# Patient Record
Sex: FEMALE | Race: BLACK OR AFRICAN AMERICAN | NOT HISPANIC OR LATINO | Employment: OTHER | URBAN - METROPOLITAN AREA
[De-identification: names, ages, dates, MRNs, and addresses within clinical notes are randomized per-mention and may not be internally consistent; named-entity substitution may affect disease eponyms.]

---

## 2020-07-09 PROBLEM — K21.9 GASTROESOPHAGEAL REFLUX DISEASE WITHOUT ESOPHAGITIS: Status: ACTIVE | Noted: 2020-07-09

## 2023-12-28 ENCOUNTER — HOSPITAL ENCOUNTER (EMERGENCY)
Facility: OTHER | Age: 48
Discharge: HOME | End: 2023-12-28
Attending: INTERNAL MEDICINE
Payer: MEDICAID

## 2023-12-28 VITALS
SYSTOLIC BLOOD PRESSURE: 123 MMHG | DIASTOLIC BLOOD PRESSURE: 84 MMHG | HEART RATE: 96 BPM | TEMPERATURE: 98 F | OXYGEN SATURATION: 99 % | RESPIRATION RATE: 16 BRPM

## 2023-12-28 DIAGNOSIS — M54.31 BILATERAL SCIATICA: Primary | ICD-10-CM

## 2023-12-28 DIAGNOSIS — M54.32 BILATERAL SCIATICA: Primary | ICD-10-CM

## 2023-12-28 PROCEDURE — 25000003 PHARM REV CODE 250: Performed by: INTERNAL MEDICINE

## 2023-12-28 PROCEDURE — 99283 EMERGENCY DEPT VISIT LOW MDM: CPT

## 2023-12-28 RX ORDER — HYDROCODONE BITARTRATE AND ACETAMINOPHEN 5; 325 MG/1; MG/1
1 TABLET ORAL
Status: COMPLETED | OUTPATIENT
Start: 2023-12-28 | End: 2023-12-28

## 2023-12-28 RX ADMIN — HYDROCODONE BITARTRATE AND ACETAMINOPHEN 1 TABLET: 5; 325 TABLET ORAL at 05:12

## 2023-12-28 NOTE — DISCHARGE INSTRUCTIONS
Diagnosis:   1. Bilateral sciatica        Tests you had showed:   Labs Reviewed - No data to display   No orders to display       Treatments you received were:   Medications   HYDROcodone-acetaminophen 5-325 mg per tablet 1 tablet (has no administration in time range)       Home Care Instructions:  - Medications: Continue taking your home medications as prescribed    Follow-Up Plan:  - Follow-up with: Primary care doctor within 1-2  days  - Additional testing and/or evaluation will be directed by your primary doctor    Return to the Emergency Department for symptoms including but not limited to: worsening symptoms, severe back pain, shortness of breath or chest pain, vomiting with inability to hold down fluids, blood in vomit or poop, fevers greater than 100.4°F, passing out/fainting/unconsciousness, or other concerning symptoms.     No future appointments.
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
## 2023-12-30 ENCOUNTER — HOSPITAL ENCOUNTER (EMERGENCY)
Facility: OTHER | Age: 48
Discharge: HOME | End: 2023-12-31
Attending: EMERGENCY MEDICINE
Payer: MEDICAID

## 2023-12-30 DIAGNOSIS — M54.50 CHRONIC LOW BACK PAIN, UNSPECIFIED BACK PAIN LATERALITY, UNSPECIFIED WHETHER SCIATICA PRESENT: Primary | ICD-10-CM

## 2023-12-30 DIAGNOSIS — G89.29 CHRONIC LOW BACK PAIN, UNSPECIFIED BACK PAIN LATERALITY, UNSPECIFIED WHETHER SCIATICA PRESENT: Primary | ICD-10-CM

## 2023-12-30 PROCEDURE — 99283 EMERGENCY DEPT VISIT LOW MDM: CPT

## 2023-12-31 VITALS
RESPIRATION RATE: 16 BRPM | OXYGEN SATURATION: 98 % | DIASTOLIC BLOOD PRESSURE: 67 MMHG | SYSTOLIC BLOOD PRESSURE: 133 MMHG | BODY MASS INDEX: 30.36 KG/M2 | HEIGHT: 62 IN | WEIGHT: 165 LBS | HEART RATE: 110 BPM | TEMPERATURE: 98 F

## 2023-12-31 PROCEDURE — 25000003 PHARM REV CODE 250: Performed by: EMERGENCY MEDICINE

## 2023-12-31 RX ORDER — HYDROCODONE BITARTRATE AND ACETAMINOPHEN 5; 325 MG/1; MG/1
1 TABLET ORAL
Status: COMPLETED | OUTPATIENT
Start: 2023-12-31 | End: 2023-12-31

## 2023-12-31 RX ADMIN — HYDROCODONE BITARTRATE AND ACETAMINOPHEN 1 TABLET: 5; 325 TABLET ORAL at 12:12
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
## 2024-01-06 ENCOUNTER — HOSPITAL ENCOUNTER (EMERGENCY)
Facility: OTHER | Age: 49
Discharge: HOME | End: 2024-01-07
Attending: INTERNAL MEDICINE
Payer: MEDICAID

## 2024-01-06 VITALS
RESPIRATION RATE: 16 BRPM | OXYGEN SATURATION: 99 % | TEMPERATURE: 98 F | WEIGHT: 165 LBS | SYSTOLIC BLOOD PRESSURE: 114 MMHG | HEART RATE: 103 BPM | DIASTOLIC BLOOD PRESSURE: 78 MMHG | HEIGHT: 63 IN | BODY MASS INDEX: 29.23 KG/M2

## 2024-01-06 DIAGNOSIS — G89.29 CHRONIC LOW BACK PAIN WITH SCIATICA, SCIATICA LATERALITY UNSPECIFIED, UNSPECIFIED BACK PAIN LATERALITY: Primary | ICD-10-CM

## 2024-01-06 DIAGNOSIS — M54.40 CHRONIC LOW BACK PAIN WITH SCIATICA, SCIATICA LATERALITY UNSPECIFIED, UNSPECIFIED BACK PAIN LATERALITY: Primary | ICD-10-CM

## 2024-01-06 PROCEDURE — 99281 EMR DPT VST MAYX REQ PHY/QHP: CPT

## 2024-01-07 NOTE — ED NOTES
"Fabby Blanco, an 48 y.o. female presents to the ED Standing upright ambulating at her own accord without issues.  [id number] PWD C/O of non-traumatic discomfort to the wrists, ankles and pelvic region X3 days.  Denies any attempt with OTC medication PTA.        Chief Complaint   Patient presents with    Generalized Body Aches     Patient reports " throbbing pain " to back , bilateral wrists , BL hips , ankles and knees . Denies any recent injury     Review of patient's allergies indicates:   Allergen Reactions    Asa [aspirin] Diarrhea    Haldol [haloperidol lactate]     Ibuprofen      Upset stomach, diarrhea    Tylenol [acetaminophen]      diarrhea     Past Medical History:   Diagnosis Date    Abnormal Pap smear     Anxiety     Mood swings                   LOC: Patient name and date of birth verified.  The patient is awake, alert and aware of environment with an appropriate affect, the patient is oriented x 3 and speaking appropriately.  Pt in NAD.    APPEARANCE: Patient resting comfortably and in no acute distress, patient's clothing is properly fastened.  SKIN: The skin is warm and dry, color consistent with ethnicity, patient has normal skin turgor and moist mucus membranes, skin intact, no breakdown or brusing noted.  MUSCULOSKELETAL: Patient moving all extremities well, no obvious swelling or deformities noted.  RESPIRATORY: Airway is open and patent, respirations are spontaneous, patient has a normal effort and rate, no accessory muscle use noted.  CARDIAC: Patient has a normal rate and rhythm, no periphreal edema noted, capillary refill < 2 seconds.  ABDOMEN: No distention noted. Bowel sounds present in all four quadrants.   NEUROLOGIC: Eyes open spontaneously, behavior appropriate to situation, follows commands, facial expression symmetrical, bilateral hand grasp equal and even, purposeful motor response noted, normal sensation in all extremities when touched.       "

## 2024-01-07 NOTE — DISCHARGE INSTRUCTIONS
Diagnosis:   1. Chronic low back pain with sciatica, sciatica laterality unspecified, unspecified back pain laterality        Tests you had showed:   Labs Reviewed - No data to display   No orders to display       Treatments you received were:   Medications - No data to display    Home Care Instructions:  - Medications: Continue taking your home medications as prescribed    Follow-Up Plan:  - Follow-up with: Primary care doctor within 1-2  days  - Additional testing and/or evaluation will be directed by your primary doctor    Return to the Emergency Department for symptoms including but not limited to: worsening symptoms, severe back pain, shortness of breath or chest pain, vomiting with inability to hold down fluids, blood in vomit or poop, fevers greater than 100.4°F, passing out/fainting/unconsciousness, or other concerning symptoms.     No future appointments.

## 2024-04-30 VITALS
WEIGHT: 165 LBS | TEMPERATURE: 98 F | SYSTOLIC BLOOD PRESSURE: 120 MMHG | OXYGEN SATURATION: 100 % | BODY MASS INDEX: 29.23 KG/M2 | RESPIRATION RATE: 14 BRPM | DIASTOLIC BLOOD PRESSURE: 80 MMHG | HEART RATE: 98 BPM

## 2024-04-30 PROCEDURE — 99281 EMR DPT VST MAYX REQ PHY/QHP: CPT

## 2024-05-01 ENCOUNTER — HOSPITAL ENCOUNTER (EMERGENCY)
Facility: HOSPITAL | Age: 49
Discharge: HOME | End: 2024-05-01
Attending: EMERGENCY MEDICINE
Payer: MEDICAID

## 2024-05-01 DIAGNOSIS — G89.29 OTHER CHRONIC PAIN: Primary | ICD-10-CM

## 2024-05-01 DIAGNOSIS — Z76.5 DRUG-SEEKING BEHAVIOR: ICD-10-CM

## 2024-05-01 NOTE — ED PROVIDER NOTES
"Source of History:  Patient  Chart    Chief complaint:  Back Pain (C/o back pain, bilateral hip pain, bilateral knee and ankle pain starting today. No recent falls. Pt is out of home hydrocodone.)      HPI:  Fabby Blanco is a 48 y.o. female with history of chronic T11 compression fracture, chronic back pain, bipolar disorder, presenting to emergency department with complaint of diffuse body pain.    Patient states that this pain is a chronic issue. No new pain or new injuries. She is prescribed opioids by her PMD but ran out early by taking them too frequently. She has never seen pain management.     Multiple drug allergies or "intolerances" list, reviewed with patient. Robaxin not listed but patient states "I don't jessica the way it makes me feel"    Review of patient's allergies indicates:   Allergen Reactions    Asa [aspirin] Diarrhea    Haldol [haloperidol lactate]     Ibuprofen      Upset stomach, diarrhea    Tylenol [acetaminophen]      diarrhea       No current facility-administered medications on file prior to encounter.     Current Outpatient Medications on File Prior to Encounter   Medication Sig Dispense Refill    chlorhexidine (PERIDEX) 0.12 % solution Use as directed 15 mLs in the mouth or throat 2 (two) times daily. 473 mL 0    diclofenac sodium (VOLTAREN) 1 % Gel Apply 2 g topically 4 (four) times daily. 20 g 0    divalproex ER (DEPAKOTE ER) 250 MG 24 hr tablet Take 3 tablets (750 mg total) by mouth once daily. 90 tablet 0    HYDROcodone-acetaminophen (NORCO) 5-325 mg per tablet Take 1 tablet by mouth every 4 (four) hours as needed for Pain. 12 tablet 0    oxyCODONE (ROXICODONE) 5 MG immediate release tablet Take 1 tablet (5 mg total) by mouth every 6 (six) hours as needed for Pain. 6 tablet 0    paliperidone palmitate (INVEGA SUSTENNA) 156 mg/mL Syrg injection Inject 1 mL (156 mg total) into the muscle every 28 days. Next injection due 8/14/20 1 mL 0       PMH:  As per HPI and below:  Past Medical " History:   Diagnosis Date    Abnormal Pap smear     Anxiety     Mood swings      Past Surgical History:   Procedure Laterality Date    NO PAST SURGERIES         Social History     Socioeconomic History    Marital status:    Tobacco Use    Smoking status: Every Day     Current packs/day: 0.50     Types: Cigarettes    Smokeless tobacco: Never   Substance and Sexual Activity    Alcohol use: No    Drug use: No       No family history on file.    Physical Exam:      Vitals:    04/30/24 2353   BP: 120/80   Pulse: 98   Resp: 14   Temp: 98.4 °F (36.9 °C)     Gen: No acute distress.  Nontoxic.  Well appearing.  Mental Status:  Alert and oriented .  Appropriate, conversant.  Skin: Warm, dry. No rashes seen.  Eyes: No conjunctival injection.  Pulm: No increased work of breathing.  No significant tachypnea.  No audible stridor or wheezing.  No conversational dyspnea.    MSK:  No deformities.    Neuro: Awake. Speech normal. No focal neuro deficit observed.    Laboratory Studies:  Labs Reviewed - No data to display    Chart reviewed.     Imaging Results    None         Medications Given:  Medications - No data to display    MDM:    48 y.o. female with chronic pain. AF, HDS. Ran out of pain medication. No new complaints.    She exhibited the following behaviors known to be associated with addiction and pseudoaddiction:  X- inability to restrict medications or take them on an agreed-upon schedule  X- a preoccupation with opioids. Refused to consider any other medications as I tried to engage her in formulating a plan.   X- insistence on rapid-onset formulations and routes of administration  X- reports of allergy or no relief whatsoever by any nonopioid treatments     I explained that it is against the policy of this department to administer or prescribe opioids for chronic pain. Patient became upset and shortly thereafter she left the department.     Diagnostic Impression:    1. Other chronic pain    2. Drug-seeking behavior          ED Disposition Condition    Discharge Stable          ED Prescriptions    None       Follow-up Information    None         Patient understands the plan and is in agreement, verbalized understanding, questions answered    Mariel Snowden MD  Emergency Medicine         Mariel Snowden MD  05/06/24 2027

## 2024-05-25 ENCOUNTER — HOSPITAL ENCOUNTER (EMERGENCY)
Facility: HOSPITAL | Age: 49
Discharge: HOME | End: 2024-05-25
Attending: EMERGENCY MEDICINE
Payer: MEDICAID

## 2024-05-25 VITALS
DIASTOLIC BLOOD PRESSURE: 74 MMHG | RESPIRATION RATE: 19 BRPM | HEART RATE: 106 BPM | TEMPERATURE: 98 F | HEIGHT: 63 IN | BODY MASS INDEX: 29.23 KG/M2 | WEIGHT: 165 LBS | OXYGEN SATURATION: 95 % | SYSTOLIC BLOOD PRESSURE: 112 MMHG

## 2024-05-25 DIAGNOSIS — K04.7 DENTAL ABSCESS: Primary | ICD-10-CM

## 2024-05-25 PROCEDURE — 99281 EMR DPT VST MAYX REQ PHY/QHP: CPT

## 2024-05-25 RX ORDER — PENICILLIN V POTASSIUM 500 MG/1
500 TABLET, FILM COATED ORAL 4 TIMES DAILY
Qty: 28 TABLET | Refills: 0 | Status: SHIPPED | OUTPATIENT
Start: 2024-05-25 | End: 2024-06-01

## 2024-05-25 RX ORDER — KETOROLAC TROMETHAMINE 10 MG/1
10 TABLET, FILM COATED ORAL EVERY 6 HOURS
Qty: 20 TABLET | Refills: 0 | Status: SHIPPED | OUTPATIENT
Start: 2024-05-25 | End: 2024-05-30

## 2024-05-25 NOTE — ED NOTES
Patient identifiers verified and correct for  MS Schilling  C/C: right lower jaw and ear pain SEE NN  APPEARANCE: awake and alert in NAD. PAIN  10/10  SKIN: warm, dry and intact. No breakdown or bruising.  MUSCULOSKELETAL: Patient moving all extremities spontaneously, no obvious swelling or deformities noted. Ambulates independently.  RESPIRATORY: Denies shortness of breath.Respirations unlabored.   CARDIAC: Denies CP, 2+ distal pulses; no peripheral edema  ABDOMEN: S/ND/NT, Denies nausea  : voids spontaneously, denies difficulty  Neurologic: AAO x 4; follows commands equal strength in all extremities; denies numbness/tingling. Denies dizziness Aubrey new wekaness

## 2024-05-25 NOTE — DISCHARGE INSTRUCTIONS
For for pain, take ketorolac 10 mg every 6 hours as needed.  While you are taking ketorolac do not take ibuprofen or aspirin.  Once he was stopped taking the ketorolac you can resume taking ibuprofen and aspirin   However you can take Tylenol 650 mg every 6-8 hours as needed along with the Toradol or ibuprofen.

## 2024-05-25 NOTE — ED PROVIDER NOTES
"Encounter Date: 5/25/2024       History     Chief Complaint   Patient presents with    Dental Pain     X2wks. Seen and evaluated by outside facility and given oxy for pain. Patients "draining" abscess twice with no resolution of symptoms. +right jaw pain with radiation up towards right ear      HPI  48-year-old female with a history of anxiety who presents with dental pain.  Seen on 05/21 for blisters to her lower right gum.  Per note, was seen at other facilities for malingering, drug-seeking behavior.  Discharged on penicillin and with supportive care.  Reports worsening symptoms.  States that the area in her right lower mouth has been draining purulent discharge.  No swelling in her mouth or in her throat or under her tongue.  No fevers or chills.  No neck stiffness or pain with range of motion.  No coughing or shortness of breath.  Did not actually  the penicillin which was ordered for her.      Review of patient's allergies indicates:   Allergen Reactions    Asa [aspirin] Diarrhea    Haldol [haloperidol lactate]     Ibuprofen      Upset stomach, diarrhea    Tylenol [acetaminophen]      diarrhea     Past Medical History:   Diagnosis Date    Abnormal Pap smear     Anxiety     Mood swings      Past Surgical History:   Procedure Laterality Date    NO PAST SURGERIES       No family history on file.  Social History     Tobacco Use    Smoking status: Every Day     Current packs/day: 0.50     Types: Cigarettes    Smokeless tobacco: Never   Substance Use Topics    Alcohol use: No    Drug use: No     Review of Systems    Physical Exam     Initial Vitals [05/25/24 1330]   BP Pulse Resp Temp SpO2   112/74 106 19 98.3 °F (36.8 °C) 95 %      MAP       --         Physical Exam    Nursing note and vitals reviewed.  Constitutional: She appears well-developed and well-nourished. No distress.   HENT:   Head: Normocephalic and atraumatic.   Nose: Nose normal.   There is a small draining abscess in the right lower mandible " under the anterior molar.  No intraoral swelling or sublingual swelling.  Patent posterior oropharynx.   Eyes: Conjunctivae and EOM are normal. Pupils are equal, round, and reactive to light.   Neck:   No submandibular swelling.  Normal neck range of motion.   Normal range of motion.  Cardiovascular:  Normal rate and normal heart sounds.           Pulmonary/Chest: Breath sounds normal. No respiratory distress.   Abdominal: She exhibits no distension.   Musculoskeletal:         General: Normal range of motion.      Cervical back: Normal range of motion.     Neurological: She is alert and oriented to person, place, and time.   Skin: Skin is warm and dry.   Psychiatric: She has a normal mood and affect.         ED Course   Procedures  Labs Reviewed   HIV 1 / 2 ANTIBODY   HEPATITIS C ANTIBODY          Imaging Results    None          Medications - No data to display  Medical Decision Making  48-year-old female who presents with dental pain.  She does have an abscess on exam but it was spontaneously draining.  No signs of Carlos's angina or deep neck space infection.  Patient has notes in her chart indicating she has exhibited drug-seeking behavior in the past.  I informed the patient that she needed to  her antibiotics and take them.  At that time she asked me for more pain medicines.  I told her we could do a dental block, IM Toradol, or oral Toradol.  At that point she left the ED without her prescriptions and stated she was not coming back and did not want them.                                      Clinical Impression:  Final diagnoses:  [K04.7] Dental abscess (Primary)          ED Disposition Condition    Discharge Stable          ED Prescriptions       Medication Sig Dispense Start Date End Date Auth. Provider    ketorolac (TORADOL) 10 mg tablet Take 1 tablet (10 mg total) by mouth every 6 (six) hours. for 5 days 20 tablet 5/25/2024 5/30/2024 Amina Hodge MD    penicillin v potassium (VEETID) 500 MG  tablet Take 1 tablet (500 mg total) by mouth 4 (four) times daily. for 7 days 28 tablet 5/25/2024 6/1/2024 Amina Hodge MD          Follow-up Information       Follow up With Specialties Details Why Contact Info    Dentistry, [facility] Schedule an appointment as soon as possible for a visit in 1 week  [address] [facility] [address]  [phone]               Amina Hodge MD  05/25/24 6314

## 2024-05-25 NOTE — ED NOTES
"Patient not in room for discharge, found patient in parking lot, instructed that I have rx for her, she said " I don't want it" informed that it is pain med and antibiotic, held her hand up and said " no" walked away, physician informed   "

## 2024-06-10 ENCOUNTER — HOSPITAL ENCOUNTER (EMERGENCY)
Facility: HOSPITAL | Age: 49
Discharge: HOME | End: 2024-06-10
Attending: STUDENT IN AN ORGANIZED HEALTH CARE EDUCATION/TRAINING PROGRAM
Payer: MEDICAID

## 2024-06-10 ENCOUNTER — HOSPITAL ENCOUNTER (EMERGENCY)
Facility: OTHER | Age: 49
Discharge: LEFT BEFORE DISCHARGE | End: 2024-06-10
Payer: MEDICAID

## 2024-06-10 VITALS
HEIGHT: 63 IN | DIASTOLIC BLOOD PRESSURE: 68 MMHG | WEIGHT: 132 LBS | BODY MASS INDEX: 23.39 KG/M2 | RESPIRATION RATE: 18 BRPM | OXYGEN SATURATION: 99 % | SYSTOLIC BLOOD PRESSURE: 106 MMHG | TEMPERATURE: 99 F | HEART RATE: 99 BPM

## 2024-06-10 VITALS
HEART RATE: 95 BPM | WEIGHT: 165 LBS | SYSTOLIC BLOOD PRESSURE: 117 MMHG | DIASTOLIC BLOOD PRESSURE: 84 MMHG | BODY MASS INDEX: 29.23 KG/M2 | OXYGEN SATURATION: 97 % | TEMPERATURE: 98 F | RESPIRATION RATE: 18 BRPM

## 2024-06-10 DIAGNOSIS — K08.89 PAIN, DENTAL: Primary | ICD-10-CM

## 2024-06-10 PROCEDURE — 99900041 HC LEFT WITHOUT BEING SEEN- EMERGENCY

## 2024-06-10 PROCEDURE — 99281 EMR DPT VST MAYX REQ PHY/QHP: CPT

## 2024-06-10 PROCEDURE — 99281 EMR DPT VST MAYX REQ PHY/QHP: CPT | Mod: 27

## 2024-06-10 NOTE — ED TRIAGE NOTES
Fabby Barnard, a 48 y.o. female presents to the ED w/ complaint of right sided lower dental pain that started earlier in the day and has worsened throughout the night. Pt reports 2 ulcers to right lower gums. Pain 10/10. Pt reports unable to eat because of pain.     Triage note:  Chief Complaint   Patient presents with    Dental Pain     Review of patient's allergies indicates:   Allergen Reactions    Asa [aspirin] Diarrhea    Haldol [haloperidol lactate]     Ibuprofen      Upset stomach, diarrhea    Tylenol [acetaminophen]      diarrhea     Past Medical History:   Diagnosis Date    Abnormal Pap smear     Anxiety     Mood swings

## 2024-06-10 NOTE — ED PROVIDER NOTES
"Encounter Date: 6/10/2024       History     Chief Complaint   Patient presents with    Dental Pain       48-year-old female with a past medical history of bipolar disorder, schizoaffective disorder, polysubstance use, thyroid disease, diabetes mellitus presents to the ED complaining of "my right tooth is enlarged".  She also reports of associated 10/10 pain that started tonight.  She also complains of right ear pain with a similar onset time.  She states that she used a tooth pick and noted some pus drainage from her teeth.  She has not taken anything for the pain today.  Per chart review patient was recently [place] [facility] [place] 1 hour prior to arrival.    The history is provided by the patient and medical records.     Review of patient's allergies indicates:   Allergen Reactions    Asa [aspirin] Diarrhea    Haldol [haloperidol lactate]     Ibuprofen      Upset stomach, diarrhea    Tylenol [acetaminophen]      diarrhea     Past Medical History:   Diagnosis Date    Abnormal Pap smear     Anxiety     Mood swings      Past Surgical History:   Procedure Laterality Date    NO PAST SURGERIES       No family history on file.  Social History     Tobacco Use    Smoking status: Every Day     Current packs/day: 0.50     Types: Cigarettes    Smokeless tobacco: Never   Substance Use Topics    Alcohol use: No    Drug use: No     Review of Systems    Physical Exam     Initial Vitals [06/10/24 0128]   BP Pulse Resp Temp SpO2   119/82 97 18 98.3 °F (36.8 °C) 97 %      MAP       --         Physical Exam    Nursing note and vitals reviewed.  Constitutional: She appears well-developed and well-nourished. She is not diaphoretic. No distress.   HENT:   Head: Normocephalic and atraumatic.   Right Ear: External ear normal.   Left Ear: External ear normal.   No right-sided mastoid tenderness to palpation.  No erythema to the external ear.  No erythema to the ear canal.  TM has a good light reflex and no obvious erythema or " signs of infection.    Aphthous ulcer noted to the right lower gums adjacent to the molars.  No obvious erythema or drainage noted to the gums.  Obvious fluctuance noted.   Eyes: EOM are normal. Right eye exhibits no discharge. Left eye exhibits no discharge.   Neck:   Normal range of motion.  Musculoskeletal:      Cervical back: Normal range of motion.     Neurological: She is alert.   Psychiatric: She has a normal mood and affect.         ED Course   Procedures  Labs Reviewed - No data to display       Imaging Results    None          Medications - No data to display  Medical Decision Making  48-year-old female who appears to be in NAD presents to the ED complaining of dental pain and ear pain.  ABC's intact.  Initial triage vital signs are within normal limits.    Differential diagnosis includes but isn't limited to aphthous ulcer, dental abscess, dental caries, otitis media, otitis externa, unlikely mastoiditis.    ED COURSE:  Patient offered ibuprofen and/or Tylenol, however, she refused.  I also offered the patient a dental block procedure to help alleviate her pain which she refused.  In the setting of no obvious fluctuance there was no need for any drainage to be performed.  I discussed strict return precautions.  I advised her to follow up with her primary care physician as soon as possible and I placed an ambulatory referral to dentistry.  Will discharge.                                      Clinical Impression:  Final diagnoses:  [K08.89] Pain, dental (Primary)          ED Disposition Condition    Discharge Stable          ED Prescriptions    None       Follow-up Information       Follow up With Specialties Details Why Contact Info    [facility] - Emergency Dept Emergency Medicine Go to  If symptoms worsen [address]  [facility] [id number]  [phone]             Liana Freeman MD  Resident  06/10/24 4239

## 2024-06-10 NOTE — DISCHARGE INSTRUCTIONS
Please return to the emergency department if you develop any new or worsening symptoms.  This could include worsening pain, fevers, drainage.    Otherwise follow-up with your primary care physician in 1 week.  Please schedule an appointment with your dentist as soon as possible.  I gave you an ambulatory referral to dentistry.

## 2024-06-10 NOTE — FIRST PROVIDER EVALUATION
"Medical screening examination initiated.  I have conducted a focused provider triage encounter, findings are as follows:    Brief history of present illness:  continued dental pain    Vitals:    06/10/24 1746   BP: 106/68   BP Location: Left arm   Patient Position: Sitting   Pulse: 99   Resp: 18   Temp: 98.5 °F (36.9 °C)   TempSrc: Oral   SpO2: 99%   Weight: 59.9 kg (132 lb)   Height: 5' 3" (1.6 m)       Pertinent physical exam:  no acute distress    Brief workup plan:  n/a    Preliminary workup initiated; this workup will be continued and followed by the physician or advanced practice provider that is assigned to the patient when roomed.  "

## 2024-06-11 NOTE — PLAN OF CARE
ALEENA faxed an Ambulatory referral/Consult to Dentistry @[facility]. Fax#[phone].    ROMIE Carranza, MSW-LMSW  Medical Social Worker/  ER Department

## 2024-06-17 ENCOUNTER — HOSPITAL ENCOUNTER (EMERGENCY)
Facility: OTHER | Age: 49
Discharge: HOME | End: 2024-06-17
Attending: EMERGENCY MEDICINE
Payer: MEDICAID

## 2024-06-17 VITALS
HEIGHT: 62 IN | SYSTOLIC BLOOD PRESSURE: 115 MMHG | TEMPERATURE: 98 F | RESPIRATION RATE: 18 BRPM | BODY MASS INDEX: 24.29 KG/M2 | DIASTOLIC BLOOD PRESSURE: 72 MMHG | WEIGHT: 132 LBS | HEART RATE: 98 BPM | OXYGEN SATURATION: 99 %

## 2024-06-17 DIAGNOSIS — M54.9 BACK PAIN, UNSPECIFIED BACK LOCATION, UNSPECIFIED BACK PAIN LATERALITY, UNSPECIFIED CHRONICITY: ICD-10-CM

## 2024-06-17 DIAGNOSIS — G89.29 OTHER CHRONIC PAIN: Primary | ICD-10-CM

## 2024-06-17 DIAGNOSIS — K08.89 PAIN, DENTAL: ICD-10-CM

## 2024-06-17 PROCEDURE — 99283 EMERGENCY DEPT VISIT LOW MDM: CPT

## 2024-06-17 PROCEDURE — 25000003 PHARM REV CODE 250: Performed by: EMERGENCY MEDICINE

## 2024-06-17 RX ORDER — HYDROCODONE BITARTRATE AND ACETAMINOPHEN 5; 325 MG/1; MG/1
1 TABLET ORAL
Status: COMPLETED | OUTPATIENT
Start: 2024-06-17 | End: 2024-06-17

## 2024-06-17 RX ORDER — AMOXICILLIN 250 MG/1
500 CAPSULE ORAL
Status: COMPLETED | OUTPATIENT
Start: 2024-06-17 | End: 2024-06-17

## 2024-06-17 RX ADMIN — HYDROCODONE BITARTRATE AND ACETAMINOPHEN 1 TABLET: 5; 325 TABLET ORAL at 10:06

## 2024-06-17 RX ADMIN — AMOXICILLIN 500 MG: 250 CAPSULE ORAL at 10:06

## 2024-06-18 ENCOUNTER — HOSPITAL ENCOUNTER (EMERGENCY)
Facility: OTHER | Age: 49
Discharge: HOME | End: 2024-06-18
Attending: EMERGENCY MEDICINE
Payer: MEDICAID

## 2024-06-18 VITALS
HEIGHT: 70 IN | TEMPERATURE: 98 F | HEART RATE: 90 BPM | OXYGEN SATURATION: 98 % | RESPIRATION RATE: 20 BRPM | SYSTOLIC BLOOD PRESSURE: 109 MMHG | BODY MASS INDEX: 18.94 KG/M2 | DIASTOLIC BLOOD PRESSURE: 70 MMHG

## 2024-06-18 DIAGNOSIS — K08.89 PAIN, DENTAL: ICD-10-CM

## 2024-06-18 DIAGNOSIS — M54.50 CHRONIC BILATERAL LOW BACK PAIN WITHOUT SCIATICA: ICD-10-CM

## 2024-06-18 DIAGNOSIS — G89.29 CHRONIC BILATERAL LOW BACK PAIN WITHOUT SCIATICA: ICD-10-CM

## 2024-06-18 DIAGNOSIS — S39.012A STRAIN OF LUMBAR REGION, INITIAL ENCOUNTER: Primary | ICD-10-CM

## 2024-06-18 PROCEDURE — 99284 EMERGENCY DEPT VISIT MOD MDM: CPT

## 2024-06-18 PROCEDURE — 25000003 PHARM REV CODE 250: Performed by: NURSE PRACTITIONER

## 2024-06-18 RX ORDER — KETOROLAC TROMETHAMINE 10 MG/1
10 TABLET, FILM COATED ORAL EVERY 6 HOURS
Qty: 15 TABLET | Refills: 0 | Status: SHIPPED | OUTPATIENT
Start: 2024-06-18

## 2024-06-18 RX ORDER — LIDOCAINE 50 MG/G
1 PATCH TOPICAL DAILY
Qty: 15 PATCH | Refills: 0 | Status: SHIPPED | OUTPATIENT
Start: 2024-06-18 | End: 2024-06-19

## 2024-06-18 RX ORDER — ORPHENADRINE CITRATE 100 MG/1
100 TABLET, EXTENDED RELEASE ORAL
Status: COMPLETED | OUTPATIENT
Start: 2024-06-18 | End: 2024-06-18

## 2024-06-18 RX ORDER — LIDOCAINE 50 MG/G
1 PATCH TOPICAL
Status: DISCONTINUED | OUTPATIENT
Start: 2024-06-18 | End: 2024-06-18 | Stop reason: HOSPADM

## 2024-06-18 RX ORDER — KETOROLAC TROMETHAMINE 10 MG/1
10 TABLET, FILM COATED ORAL
Status: COMPLETED | OUTPATIENT
Start: 2024-06-18 | End: 2024-06-18

## 2024-06-18 RX ORDER — METHOCARBAMOL 500 MG/1
1000 TABLET, FILM COATED ORAL 3 TIMES DAILY
Qty: 30 TABLET | Refills: 0 | Status: SHIPPED | OUTPATIENT
Start: 2024-06-18 | End: 2024-06-19

## 2024-06-18 RX ADMIN — LIDOCAINE 1 PATCH: 50 PATCH CUTANEOUS at 01:06

## 2024-06-18 RX ADMIN — KETOROLAC TROMETHAMINE 10 MG: 10 TABLET, FILM COATED ORAL at 01:06

## 2024-06-18 RX ADMIN — ORPHENADRINE CITRATE 100 MG: 100 TABLET, EXTENDED RELEASE ORAL at 01:06

## 2024-06-18 NOTE — DISCHARGE INSTRUCTIONS
Mrs. Barnard,    Thank you for letting me care for you today! It was nice meeting you, and I hope you feel better soon.   If you would like access to your chart and what was done today please utilize the [facility].   Please come back to [facility] for all of your future medical needs.    Our goal in the emergency department is to always give you outstanding care and exceptional service. You may receive a survey by mail or e-mail in the next week regarding your experience in our ED. We would greatly appreciate you completing and returning the survey. Your feedback provides us with a way to recognize our staff who give very good care and it helps us learn how to improve when your experience was below our aspiration of excellence.     Sincerely,    Eric Knox MD  Board Certified Emergency Physician

## 2024-06-18 NOTE — ED TRIAGE NOTES
Patient states h/o chronic back pain x 10 years. Usually takes hydrocodone but has been out for last 3-4 months. Denies using other forms of pain control - states Tylenol and Ibuprofen upset her stomach. Denies recent injury. No distress noted.
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
## 2024-06-19 ENCOUNTER — HOSPITAL ENCOUNTER (EMERGENCY)
Facility: OTHER | Age: 49
Discharge: HOME | End: 2024-06-19
Attending: EMERGENCY MEDICINE
Payer: MEDICAID

## 2024-06-19 ENCOUNTER — HOSPITAL ENCOUNTER (EMERGENCY)
Facility: OTHER | Age: 49
Discharge: STILL ADMITTED OR ADMINISTRATIVE | End: 2024-06-19
Payer: MEDICAID

## 2024-06-19 VITALS
SYSTOLIC BLOOD PRESSURE: 111 MMHG | OXYGEN SATURATION: 98 % | HEART RATE: 83 BPM | BODY MASS INDEX: 21.86 KG/M2 | DIASTOLIC BLOOD PRESSURE: 61 MMHG | WEIGHT: 136 LBS | TEMPERATURE: 99 F | HEIGHT: 66 IN | RESPIRATION RATE: 20 BRPM

## 2024-06-19 DIAGNOSIS — G89.29 CHRONIC BILATERAL LOW BACK PAIN WITHOUT SCIATICA: ICD-10-CM

## 2024-06-19 DIAGNOSIS — Z76.0 MEDICATION REFILL: Primary | ICD-10-CM

## 2024-06-19 DIAGNOSIS — M54.50 CHRONIC BILATERAL LOW BACK PAIN WITHOUT SCIATICA: ICD-10-CM

## 2024-06-19 PROCEDURE — 99281 EMR DPT VST MAYX REQ PHY/QHP: CPT

## 2024-06-19 PROCEDURE — 99999 HC NO LEVEL OF SERVICE - ED ONLY: CPT

## 2024-06-19 RX ORDER — METHOCARBAMOL 500 MG/1
1000 TABLET, FILM COATED ORAL 3 TIMES DAILY
Qty: 30 TABLET | Refills: 0 | Status: SHIPPED | OUTPATIENT
Start: 2024-06-30 | End: 2024-07-05

## 2024-06-19 RX ORDER — LIDOCAINE 50 MG/G
1 PATCH TOPICAL DAILY
Qty: 15 PATCH | Refills: 0 | Status: SHIPPED | OUTPATIENT
Start: 2024-07-04 | End: 2024-07-19

## 2024-06-19 NOTE — ED PROVIDER NOTES
"Encounter Date: 6/17/2024       History     Chief Complaint   Patient presents with    Dental Pain     Right lower tooth and gum pain, denies drainage rates pain 10/10    Back Pain     Chronic back 10/10 "for years"    Hip Pain     This is a 47 yo woman with chronic pain syndrome and mouth pain that presents for evaluation of the same.  Was seen by PCP who did not refill narcotic rx today, thereafter seen in [facility] ER and thereafter presenting here.  Has not taken anything to help with her pain. Denies fevers chills or new injury.       Review of patient's allergies indicates:   Allergen Reactions    Asa [aspirin] Diarrhea    Haldol [haloperidol lactate]     Ibuprofen      Upset stomach, diarrhea    Tylenol [acetaminophen]      diarrhea     Past Medical History:   Diagnosis Date    Abnormal Pap smear     Anxiety     Mood swings      Past Surgical History:   Procedure Laterality Date    NO PAST SURGERIES       No family history on file.  Social History     Tobacco Use    Smoking status: Every Day     Current packs/day: 0.50     Types: Cigarettes    Smokeless tobacco: Never   Substance Use Topics    Alcohol use: No    Drug use: No     Review of Systems  Constitutional-no fever  HEENT-no congestion  Eyes-no redness  Respiratory-no shortness of breath  Cardio-no chest pain  GI-no abdominal pain  Endocrine-no cold intolerance  -no difficulty urinating  MSK-no myalgias  Skin-no rashes  Allergy-no environmental allergy  Neurologic-, no headache  Hematology-no swollen nodes  Behavioral-no confusion   Physical Exam     Initial Vitals [06/17/24 2119]   BP Pulse Resp Temp SpO2   115/72 98 18 98.2 °F (36.8 °C) 99 %      MAP       --         Physical Exam  Constitutional: Well appearing, no distress.  Eyes: Conjunctivae normal.  ENT       Head: Normocephalic, atraumatic.       Nose: Normal external appearance        Mouth/Throat: no strigulous respirations   Hematological/Lymphatic/Immunilogical: no visible lymphadenopathy "   Cardiovascular: Normal rate,   Respiratory: Normal respiratory effort.   Gastrointestinal: non distended   Musculoskeletal: Normal range of motion in all extremities. No obvious deformities or swelling.  Neurologic: Alert, oriented. Normal speech and language. No gross focal neurologic deficits are appreciated.  Skin: Skin is warm, dry. No rash noted.  Psychiatric: Mood and affect are normal.    ED Course   Procedures  Labs Reviewed - No data to display       Imaging Results    None          Medications   HYDROcodone-acetaminophen 5-325 mg per tablet 1 tablet (1 tablet Oral Given 6/17/24 2207)   amoxicillin capsule 500 mg (500 mg Oral Given 6/17/24 2207)     Medical Decision Making  Low back pain is a profoundly broad differential including benign back strains and spasms to serious structural issues such as cauda equina syndrome, prolapsed disc, epidural abscess or pathologic fractures even to back pain mimics like pyelonephritis, AAA or ischemic bowel.    The differential was considered and the appropriate diagnostics were ordered therefore with a disposition determination in line with the most likely underlying cause.        Problems Addressed:  Back pain, unspecified back location, unspecified back pain laterality, unspecified chronicity: chronic illness or injury with exacerbation, progression, or side effects of treatment  Other chronic pain: chronic illness or injury with exacerbation, progression, or side effects of treatment  Pain, dental: chronic illness or injury with exacerbation, progression, or side effects of treatment    Amount and/or Complexity of Data Reviewed  External Data Reviewed: labs and notes.     Details: Seen twice today for same but not rx narcotics    Risk  OTC drugs.  Prescription drug management.  Diagnosis or treatment significantly limited by social determinants of health.  Risk Details: Chronic opioid dependency complicates this patients care                                       Clinical Impression:  Final diagnoses:  [G89.29] Other chronic pain (Primary)  [M54.9] Back pain, unspecified back location, unspecified back pain laterality, unspecified chronicity  [K08.89] Pain, dental          ED Disposition Condition    Discharge Stable          ED Prescriptions    None       Follow-up Information       Follow up With Specialties Details Why Contact Info    [facility] PAIN [facility]NAG[facility] Pain Medicine Schedule an appointment as soon as possible for a visit  As needed [address] [facility] [id number] [phone]             Eric Knox MD  06/19/24 8549
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
## 2024-06-28 ENCOUNTER — HOSPITAL ENCOUNTER (EMERGENCY)
Facility: HOSPITAL | Age: 49
Discharge: LEFT BEFORE DISCHARGE | End: 2024-06-28
Attending: EMERGENCY MEDICINE
Payer: MEDICAID

## 2024-06-28 ENCOUNTER — HOSPITAL ENCOUNTER (EMERGENCY)
Facility: HOSPITAL | Age: 49
Discharge: LEFT BEFORE DISCHARGE | End: 2024-06-28
Payer: MEDICAID

## 2024-06-28 VITALS
OXYGEN SATURATION: 98 % | BODY MASS INDEX: 21.95 KG/M2 | DIASTOLIC BLOOD PRESSURE: 69 MMHG | HEART RATE: 102 BPM | RESPIRATION RATE: 16 BRPM | TEMPERATURE: 99 F | SYSTOLIC BLOOD PRESSURE: 105 MMHG | WEIGHT: 136 LBS

## 2024-06-28 VITALS
SYSTOLIC BLOOD PRESSURE: 118 MMHG | BODY MASS INDEX: 21.86 KG/M2 | OXYGEN SATURATION: 96 % | WEIGHT: 136 LBS | HEIGHT: 66 IN | DIASTOLIC BLOOD PRESSURE: 71 MMHG | RESPIRATION RATE: 20 BRPM | HEART RATE: 109 BPM | TEMPERATURE: 99 F

## 2024-06-28 DIAGNOSIS — S89.90XA KNEE INJURY: ICD-10-CM

## 2024-06-28 DIAGNOSIS — M25.569 KNEE PAIN: ICD-10-CM

## 2024-06-28 DIAGNOSIS — M25.561 RIGHT KNEE PAIN: ICD-10-CM

## 2024-06-28 PROCEDURE — 99282 EMERGENCY DEPT VISIT SF MDM: CPT

## 2024-06-28 PROCEDURE — 99900041 HC LEFT WITHOUT BEING SEEN- EMERGENCY

## 2024-06-28 NOTE — ED NOTES
Patient not in room.  Patient called x3 with no answer.  Message left.  Patient not in imaging.  Patient not in lobby or in front of hospital.  Charge nurse and MD notified. Patient dc as eloped

## 2024-06-28 NOTE — ED TRIAGE NOTES
Pt arrives to ED via POV stating she tripped and fell 3 days ago and struck her knees. Pt denies hitting head, blood thinners, or LOC. Pt has abrasions noted to left knee and c/o bilateral knee, hip, and back pain.

## 2024-06-28 NOTE — FIRST PROVIDER EVALUATION
"Medical screening examination initiated.  I have conducted a focused provider triage encounter, findings are as follows:    Brief history of present illness:  fell on steps.  Injured knees    Vitals:    06/28/24 1716   BP: 118/71   Pulse: 109   Resp: 20   Temp: 99.1 °F (37.3 °C)   TempSrc: Oral   SpO2: 96%   Weight: 61.7 kg (136 lb)   Height: 5' 6" (1.676 m)       Pertinent physical exam:  ambulatory    Brief workup plan:  xrays    Preliminary workup initiated; this workup will be continued and followed by the physician or advanced practice provider that is assigned to the patient when roomed.  "

## 2024-06-29 NOTE — ED PROVIDER NOTES
Encounter Date: 6/28/2024       History     Chief Complaint   Patient presents with    Back Pain     Pt is S/P fall x 2 days ago and now has c/o lower back pain R hip and knee pain.      Fabby Barnard is a 48-year-old female with no significant past medical history presenting today for bilateral knee pain and right lower back pain after a fall.  This occurred 2 days ago while she was walking up stairs.  She slipped and landed on both of her knees.  Since then she has been having pain.  She has noted some bruising to the right knee and an abrasion to the left knee.  She has not taking Tylenol or Motrin.  She has been ambulating since the fall.  She did not hit her head or lose consciousness.  She checked in earlier today but eloped prior to workup.        Review of patient's allergies indicates:   Allergen Reactions    Asa [aspirin] Diarrhea    Haldol [haloperidol lactate]     Ibuprofen      Upset stomach, diarrhea    Tylenol [acetaminophen]      diarrhea     Past Medical History:   Diagnosis Date    Abnormal Pap smear     Anxiety     Mood swings      Past Surgical History:   Procedure Laterality Date    NO PAST SURGERIES       No family history on file.  Social History     Tobacco Use    Smoking status: Every Day     Current packs/day: 0.50     Types: Cigarettes    Smokeless tobacco: Never   Substance Use Topics    Alcohol use: No    Drug use: No     Review of Systems    Physical Exam     Initial Vitals [06/28/24 2002]   BP Pulse Resp Temp SpO2   105/69 102 16 98.7 °F (37.1 °C) 98 %      MAP       --         Physical Exam    Nursing note and vitals reviewed.  Constitutional: She appears well-developed and well-nourished. No distress.   HENT:   Mouth/Throat: Oropharynx is clear and moist.   Eyes: Conjunctivae are normal. No scleral icterus.   Neck: No JVD present.   Cardiovascular:  Normal rate, regular rhythm and intact distal pulses.           Pulmonary/Chest: Breath sounds normal. No respiratory distress. She has  no wheezes. She has no rales.   Abdominal: Abdomen is soft. Bowel sounds are normal. She exhibits no distension. There is no abdominal tenderness. There is no rebound.   Musculoskeletal:         General: No edema.      Lumbar back: Tenderness (Right paraspinal tenderness) present.      Right hip: No tenderness or bony tenderness. Normal range of motion.      Right knee: Ecchymosis present. No swelling, effusion, erythema, bony tenderness or crepitus. No tenderness. Normal pulse.      Left knee: No swelling, effusion, bony tenderness or crepitus. No tenderness. Normal pulse.     Lymphadenopathy:     She has no cervical adenopathy.   Neurological: She is alert and oriented to person, place, and time.   Skin: Skin is warm. No rash noted.         ED Course   Procedures  Labs Reviewed   POCT URINE PREGNANCY          Imaging Results    None          Medications - No data to display  Medical Decision Making  Emergent evaluation a 48-year-old female presenting today for bilateral knee pain, right lower back pain/hip pain s/p fall up stairs 2 days ago.    Vital signs stable.  Overall well-appearing, no acute distress.    Differential includes but not limited to contusion, abrasion, strain, sprain less likely fracture as she has been ambulatory.  No significant swelling noted.    X-rays ordered.  I told the patient the approximate wait time for x-rays currently it is about 1-2 hours, she said that is too long and does not want to stay.  I have recommended Tylenol or ibuprofen, she said that gives her diarrhea.  Will not provide stronger medication in the absence of a fracture.  She again does not want to wait for imaging.  I have offered to put her discharge papers but she declined.  Patient eloped prior to getting imaging or d/c paperwork    Amount and/or Complexity of Data Reviewed  Labs: ordered.                                      Clinical Impression:  Final diagnoses:  [M25.569] Knee pain          ED Disposition  Condition    Eloped                 Janny South MD  06/28/24 7258

## 2024-06-29 NOTE — ED TRIAGE NOTES
Fabby Barnard, a 48 y.o. female presents to the ED w/ complaint of bilateral knee and back pain. Pt states she tripped and fell on stairs 3 days ago, has been having pain since then. Pt states she was seen at [facility] for same complaint and they did nothing to help her.     Triage note:  Chief Complaint   Patient presents with    Back Pain     Pt is S/P fall x 2 days ago and now has c/o lower back pain R hip and knee pain.      Review of patient's allergies indicates:   Allergen Reactions    Asa [aspirin] Diarrhea    Haldol [haloperidol lactate]     Ibuprofen      Upset stomach, diarrhea    Tylenol [acetaminophen]      diarrhea     Past Medical History:   Diagnosis Date    Abnormal Pap smear     Anxiety     Mood swings

## 2024-06-29 NOTE — FIRST PROVIDER EVALUATION
Medical screening examination initiated.  I have conducted a focused provider triage encounter, findings are as follows:    Brief history of present illness:  Patient seen earlier today but eloped.  She returns.  Still having knee right hip and back pain after a fall 3 days ago.    Vitals:    06/28/24 2002   BP: 105/69   Pulse: 102   Resp: 16   Temp: 98.7 °F (37.1 °C)   TempSrc: Oral   SpO2: 98%   Weight: 61.7 kg (136 lb 0.4 oz)       Pertinent physical exam:  Ambulatory    Brief workup plan:  X-rays ordered    Preliminary workup initiated; this workup will be continued and followed by the physician or advanced practice provider that is assigned to the patient when roomed.

## 2024-07-17 ENCOUNTER — HOSPITAL ENCOUNTER (EMERGENCY)
Facility: OTHER | Age: 49
Discharge: HOME | End: 2024-07-17
Attending: EMERGENCY MEDICINE
Payer: MEDICAID

## 2024-07-17 VITALS
RESPIRATION RATE: 16 BRPM | OXYGEN SATURATION: 98 % | DIASTOLIC BLOOD PRESSURE: 73 MMHG | TEMPERATURE: 98 F | WEIGHT: 132 LBS | HEIGHT: 62 IN | SYSTOLIC BLOOD PRESSURE: 109 MMHG | HEART RATE: 100 BPM | BODY MASS INDEX: 24.29 KG/M2

## 2024-07-17 DIAGNOSIS — T14.90XA INJURY: ICD-10-CM

## 2024-07-17 DIAGNOSIS — T14.90XA BLUNT TRAUMA: Primary | ICD-10-CM

## 2024-07-17 PROCEDURE — 99283 EMERGENCY DEPT VISIT LOW MDM: CPT | Mod: 25
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
## 2024-07-17 NOTE — FIRST PROVIDER EVALUATION
Emergency Department TeleTriage Encounter Note      CHIEF COMPLAINT    Chief Complaint   Patient presents with    Foot Swelling     Pt reporting R foot swelling and pain after TV fell onto foot. Pt ambulatory upon ED arrival. Swelling noted to R ankle       VITAL SIGNS   Initial Vitals [07/17/24 1544]   BP Pulse Resp Temp SpO2   107/67 100 18 98.1 °F (36.7 °C) 98 %      MAP       --            ALLERGIES    Review of patient's allergies indicates:   Allergen Reactions    Asa [aspirin] Diarrhea    Haldol [haloperidol lactate]     Ibuprofen      Upset stomach, diarrhea    Tylenol [acetaminophen]      diarrhea       PROVIDER TRIAGE NOTE  Verbal consent for the teletriage evaluation was given by the patient at the start of the evaluation.  All efforts will be made to maintain patient's privacy during the evaluation.      This is a teletriage evaluation of a 48 y.o. female presenting to the ED with c/o right foot/ankle swelling, TV fell on the foot. Limited physical exam via telehealth: The patient is awake, alert, answering questions appropriately and is not in respiratory distress.  As the Teletriage provider, I performed an initial assessment and ordered appropriate labs and imaging studies, if any, to facilitate the patient's care once placed in the ED. Once a room is available, care and a full evaluation will be completed by an alternate ED provider.  Any additional orders and the final disposition will be determined by that provider.  All imaging and labs will not be followed-up by the Teletriage Team, including myself.          ORDERS  Labs Reviewed - No data to display    ED Orders (720h ago, onward)      Start Ordered     Status Ordering Provider    07/17/24 1553 07/17/24 1553  POCT urine pregnancy  Once         Ordered TREE WOLF    07/17/24 1553 07/17/24 1553  X-Ray Foot Complete Right  1 time imaging         Ordered TREE WOLF    07/17/24 1553 07/17/24 1553  X-Ray Ankle Complete Right  1 time imaging          Ordered TREE WOLF A              Virtual Visit Note: The provider triage portion of this emergency department evaluation and documentation was performed via [email or web address], a HIPAA-compliant telemedicine application, in concert with a tele-presenter in the room. A face to face patient evaluation with one of my colleagues will occur once the patient is placed in an emergency department room.      DISCLAIMER: This note was prepared with [employer] voice recognition transcription software. Garbled syntax, mangled pronouns, and other bizarre constructions may be attributed to that software system.
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
## 2024-12-18 ENCOUNTER — HOSPITAL ENCOUNTER (EMERGENCY)
Facility: HOSPITAL | Age: 49
Discharge: HOME | End: 2024-12-18
Attending: EMERGENCY MEDICINE
Payer: MEDICAID

## 2024-12-18 VITALS
DIASTOLIC BLOOD PRESSURE: 78 MMHG | RESPIRATION RATE: 21 BRPM | OXYGEN SATURATION: 99 % | WEIGHT: 157 LBS | BODY MASS INDEX: 28.89 KG/M2 | TEMPERATURE: 99 F | SYSTOLIC BLOOD PRESSURE: 124 MMHG | HEIGHT: 62 IN | HEART RATE: 86 BPM

## 2024-12-18 DIAGNOSIS — K08.89 PAIN, DENTAL: Primary | ICD-10-CM

## 2024-12-18 PROBLEM — E11.9 DIABETES MELLITUS: Status: ACTIVE | Noted: 2024-12-18

## 2024-12-18 LAB
ALBUMIN SERPL BCP-MCNC: 4 G/DL (ref 3.5–5.2)
ALP SERPL-CCNC: 196 U/L (ref 40–150)
ALT SERPL W/O P-5'-P-CCNC: 23 U/L (ref 10–44)
ANION GAP SERPL CALC-SCNC: 9 MMOL/L (ref 8–16)
AST SERPL-CCNC: 16 U/L (ref 10–40)
B-HCG UR QL: NEGATIVE
BASOPHILS # BLD AUTO: 0.05 K/UL (ref 0–0.2)
BASOPHILS NFR BLD: 0.7 % (ref 0–1.9)
BILIRUB SERPL-MCNC: 0.2 MG/DL (ref 0.1–1)
BUN SERPL-MCNC: 9 MG/DL (ref 6–20)
CALCIUM SERPL-MCNC: 9.7 MG/DL (ref 8.7–10.5)
CHLORIDE SERPL-SCNC: 102 MMOL/L (ref 95–110)
CO2 SERPL-SCNC: 24 MMOL/L (ref 23–29)
CREAT SERPL-MCNC: 0.7 MG/DL (ref 0.5–1.4)
CTP QC/QA: YES
DIFFERENTIAL METHOD BLD: ABNORMAL
EOSINOPHIL # BLD AUTO: 0.2 K/UL (ref 0–0.5)
EOSINOPHIL NFR BLD: 2.8 % (ref 0–8)
ERYTHROCYTE [DISTWIDTH] IN BLOOD BY AUTOMATED COUNT: 13 % (ref 11.5–14.5)
EST. GFR  (NO RACE VARIABLE): >60 ML/MIN/1.73 M^2
GLUCOSE SERPL-MCNC: 249 MG/DL (ref 70–110)
HCT VFR BLD AUTO: 45.6 % (ref 37–48.5)
HGB BLD-MCNC: 15.6 G/DL (ref 12–16)
IMM GRANULOCYTES # BLD AUTO: 0.02 K/UL (ref 0–0.04)
IMM GRANULOCYTES NFR BLD AUTO: 0.3 % (ref 0–0.5)
LYMPHOCYTES # BLD AUTO: 3.3 K/UL (ref 1–4.8)
LYMPHOCYTES NFR BLD: 47.7 % (ref 18–48)
MCH RBC QN AUTO: 31.3 PG (ref 27–31)
MCHC RBC AUTO-ENTMCNC: 34.2 G/DL (ref 32–36)
MCV RBC AUTO: 91 FL (ref 82–98)
MONOCYTES # BLD AUTO: 0.5 K/UL (ref 0.3–1)
MONOCYTES NFR BLD: 6.7 % (ref 4–15)
NEUTROPHILS # BLD AUTO: 2.9 K/UL (ref 1.8–7.7)
NEUTROPHILS NFR BLD: 41.8 % (ref 38–73)
NRBC BLD-RTO: 0 /100 WBC
PLATELET # BLD AUTO: 321 K/UL (ref 150–450)
PMV BLD AUTO: 10.1 FL (ref 9.2–12.9)
POTASSIUM SERPL-SCNC: 4 MMOL/L (ref 3.5–5.1)
PROT SERPL-MCNC: 8 G/DL (ref 6–8.4)
RBC # BLD AUTO: 4.99 M/UL (ref 4–5.4)
SODIUM SERPL-SCNC: 135 MMOL/L (ref 136–145)
WBC # BLD AUTO: 6.85 K/UL (ref 3.9–12.7)

## 2024-12-18 PROCEDURE — 80053 COMPREHEN METABOLIC PANEL: CPT | Performed by: EMERGENCY MEDICINE

## 2024-12-18 PROCEDURE — 63600175 PHARM REV CODE 636 W HCPCS: Performed by: EMERGENCY MEDICINE

## 2024-12-18 PROCEDURE — 85025 COMPLETE CBC W/AUTO DIFF WBC: CPT | Performed by: EMERGENCY MEDICINE

## 2024-12-18 PROCEDURE — 99285 EMERGENCY DEPT VISIT HI MDM: CPT | Mod: 25

## 2024-12-18 PROCEDURE — 81025 URINE PREGNANCY TEST: CPT | Performed by: EMERGENCY MEDICINE

## 2024-12-18 PROCEDURE — 96374 THER/PROPH/DIAG INJ IV PUSH: CPT

## 2024-12-18 PROCEDURE — 25500020 PHARM REV CODE 255: Performed by: EMERGENCY MEDICINE

## 2024-12-18 PROCEDURE — 96375 TX/PRO/DX INJ NEW DRUG ADDON: CPT

## 2024-12-18 RX ORDER — MORPHINE SULFATE 2 MG/ML
6 INJECTION, SOLUTION INTRAMUSCULAR; INTRAVENOUS
Status: COMPLETED | OUTPATIENT
Start: 2024-12-18 | End: 2024-12-18

## 2024-12-18 RX ORDER — ONDANSETRON HYDROCHLORIDE 2 MG/ML
4 INJECTION, SOLUTION INTRAVENOUS
Status: COMPLETED | OUTPATIENT
Start: 2024-12-18 | End: 2024-12-18

## 2024-12-18 RX ORDER — AMOXICILLIN AND CLAVULANATE POTASSIUM 875; 125 MG/1; MG/1
1 TABLET, FILM COATED ORAL EVERY 12 HOURS
Qty: 14 TABLET | Refills: 0 | Status: SHIPPED | OUTPATIENT
Start: 2024-12-18 | End: 2024-12-25

## 2024-12-18 RX ADMIN — ONDANSETRON 4 MG: 2 INJECTION INTRAMUSCULAR; INTRAVENOUS at 03:12

## 2024-12-18 RX ADMIN — IOHEXOL 75 ML: 350 INJECTION, SOLUTION INTRAVENOUS at 04:12

## 2024-12-18 RX ADMIN — MORPHINE SULFATE 6 MG: 2 INJECTION, SOLUTION INTRAMUSCULAR; INTRAVENOUS at 03:12

## 2024-12-18 NOTE — FIRST PROVIDER EVALUATION
Medical screening examination initiated.  I have conducted a focused provider triage encounter, findings are as follows:    Brief history of present illness:  Right sided toothache.  Had a tooth pulled 3 days ago.  Now it's swollen.  Lost her prescription for pain medication.  Not on antibiotics    There were no vitals filed for this visit.    Pertinent physical exam:  No respiratory diostriess    Brief workup plan:  Intake eval    Preliminary workup initiated; this workup will be continued and followed by the physician or advanced practice provider that is assigned to the patient when roomed.

## 2024-12-18 NOTE — DISCHARGE INSTRUCTIONS
We know that you have many choices and are honored that you chose us. We hope that we met or exceeded your expectations and goals for this visit and will keep the [facility] family in mind for your future needs and those of your family and friends.     - Dr. Lombardo

## 2024-12-18 NOTE — ED PROVIDER NOTES
Emergency Department Provider Note    Fabby Barnard   49 y.o. female   [id number]      12/18/2024       History     This history was obtained from the patient without limitations.  She was driven to the ED by someone else.      She is a 49-year-old with the below past medical history.  She had a broken right mandibular molar tooth extracted two days ago.  She complains of worsening pain and swelling with the left her jaw since the procedure.  She denies fever, chills, headache, lightheadedness, and dizziness.  She denies nausea and vomiting.  She denies sore throat and difficulty swallowing.  She denies shortness of breath.  Her pain is not improved with acetaminophen.  She was prescribed Norco but lost the prescription.         Past Medical History:   Diagnosis Date    Abnormal Pap smear     Anxiety     Diabetes mellitus 12/18/2024    Mood swings       Past Surgical History:   Procedure Laterality Date    NO PAST SURGERIES        No family history on file.   Social History     Socioeconomic History    Marital status:    Tobacco Use    Smoking status: Every Day     Current packs/day: 0.50     Types: Cigarettes    Smokeless tobacco: Never   Substance and Sexual Activity    Alcohol use: No    Drug use: No      Review of patient's allergies indicates:   Allergen Reactions    Asa [aspirin] Diarrhea    Haldol [haloperidol lactate]     Ibuprofen      Upset stomach, diarrhea    Tylenol [acetaminophen]      diarrhea           Physical Examination     Initial Vitals [12/18/24 1346]   BP Pulse Resp Temp SpO2   128/86 103 18 98.6 °F (37 °C) 99 %      MAP       --           Physical Exam    Nursing note and vitals reviewed.  Constitutional: She is not diaphoretic. No distress.   HENT: Mouth/Throat: Oropharynx is clear and moist.   Evidence of recent extraction at right mandibular second molar region.  Granulation tissue present.  Minor bleeding with disruption of this tissue.  No swelling of the gums.  No sublingual  swelling. No foul odor.    Mild swelling and tenderness beneath proximal body of right mandible. No erythema or ecchymosis.   Pulmonary/Chest: No respiratory distress.     Neurological: She is alert and oriented to person, place, and time. GCS score is 15. GCS eye subscore is 4. GCS verbal subscore is 5. GCS motor subscore is 6.   Skin: Skin is warm and dry. No pallor.            Labs     Labs Reviewed   CBC W/ AUTO DIFFERENTIAL - Abnormal       Result Value    WBC 6.85      RBC 4.99      Hemoglobin 15.6      Hematocrit 45.6      MCV 91      MCH 31.3 (*)     MCHC 34.2      RDW 13.0      Platelets 321      MPV 10.1      Immature Granulocytes 0.3      Gran # (ANC) 2.9      Immature Grans (Abs) 0.02      Lymph # 3.3      Mono # 0.5      Eos # 0.2      Baso # 0.05      nRBC 0      Gran % 41.8      Lymph % 47.7      Mono % 6.7      Eosinophil % 2.8      Basophil % 0.7      Differential Method Automated     COMPREHENSIVE METABOLIC PANEL - Abnormal    Sodium 135 (*)     Potassium 4.0      Chloride 102      CO2 24      Glucose 249 (*)     BUN 9      Creatinine 0.7      Calcium 9.7      Total Protein 8.0      Albumin 4.0      Total Bilirubin 0.2      Alkaline Phosphatase 196 (*)     AST 16      ALT 23      eGFR >60.0      Anion Gap 9     POCT URINE PREGNANCY    POC Preg Test, Ur Negative       Acceptable Yes          Imaging     Imaging Results              CT Maxillofacial With Contrast (Final result)  Result time 12/18/24 16:10:33      Final result by Shawn Maldonado DO (12/18/24 16:10:33)                   Impression:      Operative change from right mandibular recent molar tooth extraction.    No evidence for focal collection throughout the face to suggest abscess    There is question subtle induration within the submental soft tissues component of cellulitis not excluded    Otherwise unremarkable postcontrast CT neck as detailed      Electronically signed by: Shawn Maldonado  DO  Date:    12/18/2024  Time:    16:10               Narrative:    EXAMINATION:  CT MAXILLOFACIAL WITH CONTRAST    CLINICAL HISTORY:  Sublingual/submandibular abscess;    TECHNIQUE:  Multiple sequential 2.5 mm axial images of the maxillofacial with contrast.  Coronal and sagittal reformatted imaging from the axial acquisition.  75 mL of Omnipaque 350 contrast was infused intravenously.    COMPARISON:  CT maxillofacial without contrast 04/24/2019    FINDINGS:  No evidence for adenopathy or peripheral enhancing collection throughout the face to suggest abscess.  There is operative change with right maxillary molar tooth dental extraction.    Question slight induration within the submental superficial soft tissues which may represent cellulitis.  Clinical correlation advised    No evidence for acute fracture or dislocation of mandible    No evidence for acute fracture of the maxillofacial bones.  Major vasculature grossly patent throughout the face and skull base.  Visualized intracranial vasculature grossly patent.  No evidence for acute fracture or traumatic subluxation visualized cervical spine.                                        ED Course     The patient received the following medications:  Medications   morphine injection 6 mg (6 mg Intravenous Given 12/18/24 1545)   ondansetron injection 4 mg (4 mg Intravenous Given 12/18/24 1546)   iohexoL (OMNIPAQUE 350) injection 100 mL (75 mLs Intravenous Given 12/18/24 1601)         ED Course as of 12/18/24 1633   Wed Dec 18, 2024   1619 The below flagged abnormal lab results are significant for mild hyponatremia, hyperglycemia, and mild elevation of alkaline phosphatase.  These abnormalities are unremarkable in the context of the patient's presenting complaints [LP]   1619 CBC auto differential(!) [LP]   1619 MCH(!): 31.3 [LP]   1619 Comprehensive metabolic panel(!) [LP]   1619 Sodium(!): 135 [LP]   1619 Glucose(!): 249 [LP]   1619 ALP(!): 196 [LP]      ED Course User  Index  [LP] Son Lombardo III, MD        Medical Decision Making                 Medical Decision Making  The patient is having pain and swelling at the site of recent dental extraction.  Her vital signs were normal.  She did not appear to be in distress.  Differential diagnoses included inflammation from recent dental procedure, gingivitis, dental abscess, stomatitis, sublingual abscess. Her workup consisted up labs and CT imaging of the face with IV contrast. She received IV morphine and zofran.  Lab results were unremarkable.  There was no abscess or other concerning findings on CT. She was prescribed a course of Augmentin and instructed to follow-up with the dentist who performed the extraction.    Amount and/or Complexity of Data Reviewed  Labs: ordered. Decision-making details documented in ED Course.  Radiology: ordered.    Risk  Prescription drug management.              Diagnoses       ICD-10-CM ICD-9-CM   1. Pain, dental  K08.89 525.9         Dispostion      ED Disposition Condition    Discharge Stable            ED Prescriptions       Medication Sig Dispense Start Date End Date Auth. Provider    amoxicillin-clavulanate 875-125mg (AUGMENTIN) 875-125 mg per tablet Take 1 tablet by mouth every 12 (twelve) hours. for 7 days 14 tablet 12/18/2024 12/25/2024 Son Lombardo III, MD            Follow-up Information       Follow up With Specialties Details Why Contact Info    A dentist   Schedule close follow-up with the dentist who performed your extraction.     ER   Return to the ER if your condition worsens or you have any other concerns that you feel need immediate attention.               Son Lombardo III, MD  12/18/24 7431

## 2024-12-18 NOTE — ED TRIAGE NOTES
"Fabby Barnard, a 49 y.o. female presents to the ED w/ complaint of oral pain. Pt states she had a dental extraction three days ago. Reports swelling and pain to lower right side.     Triage note:  Chief Complaint   Patient presents with    Oral Swelling     Patient had a tooth extracted to her bottom L jaw 3 days ago. States that they prescribed her something for pain but "she lost it". Endorses swelling to the area and L neck pain.      Review of patient's allergies indicates:   Allergen Reactions    Asa [aspirin] Diarrhea    Haldol [haloperidol lactate]     Ibuprofen      Upset stomach, diarrhea    Tylenol [acetaminophen]      diarrhea     Past Medical History:   Diagnosis Date    Abnormal Pap smear     Anxiety     Mood swings      APPEARANCE: awake and alert in NAD. PAIN  8/10  SKIN: warm, dry and intact. No breakdown or bruising.  MUSCULOSKELETAL: Patient moving all extremities spontaneously, no obvious swelling or deformities noted. Ambulates independently.  RESPIRATORY: Denies shortness of breath.Respirations unlabored.   CARDIAC: Denies CP, 2+ distal pulses; no peripheral edema  ABDOMEN: S/ND/NT, Denies nausea  : voids spontaneously, denies difficulty  Neurologic: AAO x 4; follows commands equal strength in all extremities; denies numbness/tingling. Denies dizziness    " No abnormalities noted

## 2024-12-31 ENCOUNTER — HOSPITAL ENCOUNTER (EMERGENCY)
Facility: OTHER | Age: 49
Discharge: HOME | End: 2024-12-31
Attending: EMERGENCY MEDICINE
Payer: MEDICAID

## 2024-12-31 VITALS
OXYGEN SATURATION: 98 % | SYSTOLIC BLOOD PRESSURE: 129 MMHG | BODY MASS INDEX: 28.89 KG/M2 | TEMPERATURE: 98 F | HEART RATE: 98 BPM | HEIGHT: 62 IN | RESPIRATION RATE: 19 BRPM | WEIGHT: 157 LBS | DIASTOLIC BLOOD PRESSURE: 77 MMHG

## 2024-12-31 DIAGNOSIS — K08.89 PAIN, DENTAL: Primary | ICD-10-CM

## 2024-12-31 PROCEDURE — 99283 EMERGENCY DEPT VISIT LOW MDM: CPT

## 2024-12-31 RX ORDER — LIDOCAINE HYDROCHLORIDE 20 MG/ML
SOLUTION OROPHARYNGEAL
Qty: 100 ML | Refills: 0 | Status: SHIPPED | OUTPATIENT
Start: 2024-12-31

## 2025-01-01 NOTE — DISCHARGE INSTRUCTIONS
Thank you for letting me care for you today - it was nice to meet you and I hope you feel better soon.  Please follow up with your dentist    Rotate between 500-1000mg (acetaminophen) Tylenol and 400-600mg ibuprofen (Motrin), switching every 3 hours. For example, acetaminophen at 8am, ibuprofen at 11am, then tylenol at 2pm.  Do not take more than 3000 mg of Tylenol in 1 day or more than 2400 mg of ibuprofen and 1 day.     I sent in the following medication to your pharmacy:   Lidocaine gel: 5mL, swish in mouth and spit out.     Our goal at [facility] is to always give you outstanding care and exceptional service. You may receive a survey by mail or email in the next week about your experience in our ED. We would greatly appreciate you completing and returning the survey. Your feedback provides us with a way to recognize our staff who give very good care and it helps us learn how to improve when your experience was below our aspiration of excellence.     All the best,     Mesha Salazar, MPH, PA-C  Emergency Department Physician Assistant  [facility], [facility], [facility]

## 2025-01-01 NOTE — ED TRIAGE NOTES
"Fabby Barnard, a 49 y.o. female presents to the ED w/ complaint of Dental Pain. Reports "having tooth pulled 3 days ago, and is experiencing pain." Pt stated, " they prescribed me Tylenol, but it didn't get filled." Pt has not taken any antibiotics. Hx of Diabetes. Denies s/s of CP, SOB, Headache, Dizziness, n/v, diarrhea. AAOx4. NAD noted.     Triage note:  Chief Complaint   Patient presents with    Dental Pain     Pt c/o pain after having a tooth pulled on the left lower side 3 days ago. No fevers.     Review of patient's allergies indicates:   Allergen Reactions    Asa [aspirin] Diarrhea    Haldol [haloperidol lactate]     Ibuprofen      Upset stomach, diarrhea    Tylenol [acetaminophen]      diarrhea     Past Medical History:   Diagnosis Date    Abnormal Pap smear     Anxiety     Diabetes mellitus 12/18/2024    Mood swings           APPEARANCE: awake and alert in NAD.  SKIN: warm, dry and intact. No breakdown or bruising.  MUSCULOSKELETAL: Patient moving all extremities spontaneously, no obvious swelling or deformities noted. Ambulates independently.  RESPIRATORY: Denies shortness of breath.Respirations unlabored.   CARDIAC: Denies CP, 2+ distal pulses; no peripheral edema  ABDOMEN: S/ND/NT, Denies n/v.   : voids spontaneously, denies difficulty  Neurologic: AAO x 4; follows commands equal strength in all extremities; denies numbness/tingling. Denies dizziness, FRANCESCA.    "

## 2025-01-01 NOTE — FIRST PROVIDER EVALUATION
Emergency Department TeleTriage Encounter Note      CHIEF COMPLAINT    Chief Complaint   Patient presents with    Dental Pain     Pt c/o pain after having a tooth pulled on the left lower side 3 days ago. No fevers.       VITAL SIGNS   Initial Vitals [12/31/24 1841]   BP Pulse Resp Temp SpO2   129/77 98 19 98.2 °F (36.8 °C) 98 %      MAP       --            ALLERGIES    Review of patient's allergies indicates:   Allergen Reactions    Asa [aspirin] Diarrhea    Haldol [haloperidol lactate]     Ibuprofen      Upset stomach, diarrhea    Tylenol [acetaminophen]      diarrhea       PROVIDER TRIAGE NOTE  This is a teletriage evaluation of a 49 y.o. female presenting to the ED complaining of dental pain. Patient had tooth pulled 3 days ago. She has pain at that site. There is facial swelling. She denies taking any medications for her symptoms.     Patient is alert and oriented. She speaks in complete sentences. She is sitting upright in the chair in no distress.     Initial orders will be placed and care will be transferred to an alternate provider when patient is roomed for a full evaluation. Any additional orders and the final disposition will be determined by that provider.         ORDERS  Labs Reviewed - No data to display    ED Orders (720h ago, onward)      None              Virtual Visit Note: The provider triage portion of this emergency department evaluation and documentation was performed via [employer], a HIPAA-compliant telemedicine application, in concert with a tele-presenter in the room. A face to face patient evaluation with one of my colleagues will occur once the patient is placed in an emergency department room.      DISCLAIMER: This note was prepared with [employer] voice recognition transcription software. Garbled syntax, mangled pronouns, and other bizarre constructions may be attributed to that software system.

## 2025-01-01 NOTE — ED PROVIDER NOTES
Encounter Date: 12/31/2024       History     Chief Complaint   Patient presents with    Dental Pain     Pt c/o pain after having a tooth pulled on the left lower side 3 days ago. No fevers.     49-year-old female past medical history of polysubstance dependence, poor dentition presents to emergency department with left lower tooth pain after having a tooth extraction from 3 days ago.  Patient was seen at [facility] earlier today for the same complaint.  States that she has taken no medication today for symptom relief.  Says that she is allergic to ibuprofen and Tylenol. Patient says that she has not been smoking or drinking from straws.  Denies any drainage or bleeding from site.  Patient is able to tolerate secretions.  Denies fever, chills, facial swelling, or any other at this time.    The history is provided by the patient and medical records.     Review of patient's allergies indicates:   Allergen Reactions    Asa [aspirin] Diarrhea    Haldol [haloperidol lactate]     Ibuprofen      Upset stomach, diarrhea    Tylenol [acetaminophen]      diarrhea     Past Medical History:   Diagnosis Date    Abnormal Pap smear     Anxiety     Diabetes mellitus 12/18/2024    Mood swings      Past Surgical History:   Procedure Laterality Date    NO PAST SURGERIES       No family history on file.  Social History     Tobacco Use    Smoking status: Every Day     Current packs/day: 0.50     Types: Cigarettes    Smokeless tobacco: Never   Substance Use Topics    Alcohol use: No    Drug use: No     Review of Systems   Constitutional:  Negative for chills and fever.   HENT:  Positive for dental problem. Negative for facial swelling, trouble swallowing and voice change.    Musculoskeletal:  Negative for neck pain and neck stiffness.       Physical Exam     Initial Vitals [12/31/24 1841]   BP Pulse Resp Temp SpO2   129/77 98 19 98.2 °F (36.8 °C) 98 %      MAP       --         Physical Exam    Constitutional: She appears well-developed and  well-nourished.   HENT:   Head: Normocephalic and atraumatic. Mouth/Throat:       Recent extraction of the left lower premolar, clots in place, no signs of dry socket.  No purulent drainage.  No evidence of abscess.  No facial swelling.  No Carlos's.  Patient tolerating secretions.     Neurological: She is alert.         ED Course   Procedures  Labs Reviewed - No data to display       Imaging Results    None          Medications - No data to display  Medical Decision Making  Fabby Barnard is a 49 y.o. female who presents today complaining of dental pain.. The patient denies any fevers, chills, nausea, vomiting, diarrhea/dysurea, neck stiffness, photophobia, or any other complaints. The pt is tolerating po's.    Differential diagnosis includes, but is not limited to: Dry socket, dental caries, pulpitis, dental abscess, Carlos's, dental fracture, osteomyelitis     The patient appears to have pulpitis with chronic dental caries.  Based upon the history and physical I see no signs of Carlos's angina, sublingual swelling, facial swelling, airway compromise, facial cellulitis, sepsis, dehydration, or a fluctuant abscess to drain.     I believe the patient can be discharged home with rx for viscous lidocaine.  Recommended that patient follow up with dentist.The results and physical exam findings were reviewed with the patient. Pt agrees with assessment, disposition and treatment plan and has no further questions or complaints at this time.    Risk  Prescription drug management.                                      Clinical Impression:  Final diagnoses:  [K08.89] Pain, dental (Primary)          ED Disposition Condition    Discharge Stable          ED Prescriptions       Medication Sig Dispense Start Date End Date Auth. Provider    LIDOcaine viscous HCl 2% (XYLOCAINE) 2 % Soln by Mucous Membrane route every 3 (three) hours. 100 mL 12/31/2024 -- Mesha Salazar PA-C          Follow-up Information    None          Mesha Salazar  MITZY  01/02/25 1634

## 2025-01-25 ENCOUNTER — HOSPITAL ENCOUNTER (EMERGENCY)
Facility: OTHER | Age: 50
Discharge: HOME | End: 2025-01-25
Attending: EMERGENCY MEDICINE
Payer: MEDICAID

## 2025-01-25 VITALS
DIASTOLIC BLOOD PRESSURE: 75 MMHG | WEIGHT: 147 LBS | RESPIRATION RATE: 18 BRPM | TEMPERATURE: 98 F | HEIGHT: 62 IN | SYSTOLIC BLOOD PRESSURE: 125 MMHG | BODY MASS INDEX: 27.05 KG/M2 | HEART RATE: 85 BPM | OXYGEN SATURATION: 100 %

## 2025-01-25 DIAGNOSIS — G89.29 OTHER CHRONIC PAIN: ICD-10-CM

## 2025-01-25 DIAGNOSIS — Z71.1 PHYSICALLY WELL BUT WORRIED: Primary | ICD-10-CM

## 2025-01-25 LAB
B-HCG UR QL: NEGATIVE
CTP QC/QA: YES

## 2025-01-25 PROCEDURE — 99283 EMERGENCY DEPT VISIT LOW MDM: CPT

## 2025-01-25 PROCEDURE — 81025 URINE PREGNANCY TEST: CPT | Performed by: EMERGENCY MEDICINE

## 2025-01-25 PROCEDURE — 25000003 PHARM REV CODE 250: Performed by: EMERGENCY MEDICINE

## 2025-01-25 RX ORDER — HYDROCODONE BITARTRATE AND ACETAMINOPHEN 5; 325 MG/1; MG/1
1 TABLET ORAL
Status: COMPLETED | OUTPATIENT
Start: 2025-01-25 | End: 2025-01-25

## 2025-01-25 RX ADMIN — HYDROCODONE BITARTRATE AND ACETAMINOPHEN 1 TABLET: 5; 325 TABLET ORAL at 03:01

## 2025-01-25 NOTE — DISCHARGE INSTRUCTIONS
Please follow-up with your primary care doctor and your OBGYN.  The emergency room will not prescribe controlled substance for chronic conditions - you will need to see your regular doctor.

## 2025-01-25 NOTE — ED PROVIDER NOTES
"Encounter Date: 1/25/2025    SCRIBE #1 NOTE: I, Joann Ramirez, am scribing for, and in the presence of,  Nayeli Armijo MD. I have scribed the following portions of the note - Other sections scribed: HPI, ROS.       History     Chief Complaint   Patient presents with    Vaginal Discharge     Pt complains of vaginal and rectal discharge, seen at [facility] but wants another opinion and a prescription.     Time seen by provider: 3:32 AM    This is a 49 y.o. female who presents with complaint of vaginal and anal discharge. She notes yellow discharge from her vagina for the past week, and black "yeasty" discharge from her anus for 2 days. She denies history of anal intercourse. She has a follow up with her gynecologist (Dr. Tran) next week. She also has leg and back pain but denies any recent trauma. She notes that she normally takes hydrocodone for her pain but she has been without it for 2 weeks. She has a past medical history of DM and is compliant with her medications.  She does not have a past surgical history. She smokes 2/3 PPD, but does not drink or use illicit drugs. This is the extent of the patient's complaints at this time.          The history is provided by the patient. No  was used.     Review of patient's allergies indicates:   Allergen Reactions    Asa [aspirin] Diarrhea    Haldol [haloperidol lactate]     Ibuprofen      Upset stomach, diarrhea    Tylenol [acetaminophen]      diarrhea     Past Medical History:   Diagnosis Date    Abnormal Pap smear     Anxiety     Diabetes mellitus 12/18/2024    Mood swings      Past Surgical History:   Procedure Laterality Date    NO PAST SURGERIES       No family history on file.  Social History     Tobacco Use    Smoking status: Every Day     Current packs/day: 0.50     Average packs/day: 0.5 packs/day for 29.1 years (14.5 ttl pk-yrs)     Types: Cigarettes     Start date: 1996    Smokeless tobacco: Never   Substance Use Topics    Alcohol use: No    " Drug use: No     Review of Systems   Constitutional:  Negative for chills and fever.   HENT:  Negative for congestion and sore throat.    Eyes:  Negative for visual disturbance.   Respiratory:  Negative for cough and shortness of breath.    Cardiovascular:  Negative for chest pain and palpitations.   Gastrointestinal:  Negative for abdominal pain, diarrhea and vomiting.   Genitourinary:  Positive for vaginal discharge. Negative for decreased urine volume and dysuria.   Musculoskeletal:  Positive for arthralgias (leg pain) and back pain. Negative for joint swelling, neck pain and neck stiffness.   Skin:  Negative for rash and wound.   Neurological:  Negative for weakness, numbness and headaches.   Psychiatric/Behavioral:  Negative for confusion.        Physical Exam     Initial Vitals [01/25/25 0110]   BP Pulse Resp Temp SpO2   123/74 90 18 98 °F (36.7 °C) 99 %      MAP       --         Physical Exam    Nursing note and vitals reviewed.  Constitutional: She appears well-developed and well-nourished. No distress.   HENT:   Head: Normocephalic and atraumatic. Mouth/Throat: Oropharynx is clear and moist. No oropharyngeal exudate.   Eyes: Conjunctivae and EOM are normal. Pupils are equal, round, and reactive to light.   Neck: Neck supple.   Cardiovascular:  Normal rate and normal heart sounds.           No murmur heard.  Pulmonary/Chest: Breath sounds normal. No respiratory distress. She has no wheezes. She has no rhonchi. She has no rales.   Abdominal: Abdomen is soft. There is no abdominal tenderness. There is no rebound and no guarding.   Genitourinary:    Genitourinary Comments: Rectal exam:  No internal external hemorrhoids noted or palpated, scant brown stool which is Hemoccult negative.  No discharge noted.     Musculoskeletal:         General: No tenderness or edema.      Cervical back: Neck supple.     Neurological: She is alert and oriented to person, place, and time. She has normal strength. GCS score is 15.  GCS eye subscore is 4. GCS verbal subscore is 5. GCS motor subscore is 6.   Skin: Skin is warm and dry. No rash noted.   Psychiatric: Thought content normal. Her mood appears anxious.         ED Course   Procedures  Labs Reviewed   POCT URINE PREGNANCY       Result Value    POC Preg Test, Ur Negative       Acceptable Yes            Imaging Results    None          Medications   HYDROcodone-acetaminophen 5-325 mg per tablet 1 tablet (1 tablet Oral Given 1/25/25 0345)     Medical Decision Making  Urgent evaluation a 49-year-old female who presents with complaint of vaginal and anal discharge, seen at another facility yesterday for same complaint.  Patient had a pelvic exam with STD testing on yesterday.  Per medical record she also had an appointment with her OBGYN 10 days ago.  I do not think repeated pelvic exams are appropriate.  I did do a rectal exam which showed no discharge; she denies anal receptive intercourse and I do not think this necessitates further emergency workup at this time.  She reports chronic pain, requesting narcotic prescription.  She is treated here with the hydrocodone, but advised that prescriptions for controlled substances that are chronic in nature will not be refilled by the emergency room.  She is advised to keep scheduled follow-up with her OBGYN, and return for new concerns.    Amount and/or Complexity of Data Reviewed  External Data Reviewed: notes.  Labs: ordered. Decision-making details documented in ED Course.    Risk  Prescription drug management.            Scribe Attestation:   Scribe #1: I performed the above scribed service and the documentation accurately describes the services I performed. I attest to the accuracy of the note.              Physician Attestation for Scribe: I, Nayeli Armijo, reviewed documentation as scribed in my presence, which is both accurate and complete.             Clinical Impression:  Final diagnoses:  [Z71.1] Physically well but  worried (Primary)  [G89.29] Other chronic pain          ED Disposition Condition    Discharge Stable          ED Prescriptions    None       Follow-up Information       Follow up With Specialties Details Why Contact Info    Jeremiah Cash MD Obstetrics and Gynecology, Obstetrics, Gynecology Schedule an appointment as soon as possible for a visit   [address] [place] [id number]  [phone]               Nayeli Armijo MD  01/26/25 0004

## 2025-01-25 NOTE — ED TRIAGE NOTES
Fabby Barnard, a 49 y.o. female presents to the ED complaining of vaginal discharge, black rectal discharge, bilateral leg pain, and low back pain x 2 days. Patient denies bloody stool when having a bowel movement.    Patient is A&Ox4. Denies any other complaints. ED workup in progress. VSS. Safety measures in place; side rails up x2. Call light within pt reach. Plan of care ongoing.    Chief Complaint   Patient presents with    Vaginal Discharge     Pt complains of vaginal and rectal discharge, seen at [facility] but wants another opinion and a prescription.